# Patient Record
Sex: MALE | Race: WHITE | NOT HISPANIC OR LATINO | Employment: PART TIME | URBAN - NONMETROPOLITAN AREA
[De-identification: names, ages, dates, MRNs, and addresses within clinical notes are randomized per-mention and may not be internally consistent; named-entity substitution may affect disease eponyms.]

---

## 2020-02-18 ENCOUNTER — OFFICE VISIT (OUTPATIENT)
Dept: FAMILY MEDICINE CLINIC | Facility: CLINIC | Age: 40
End: 2020-02-18
Payer: COMMERCIAL

## 2020-02-18 VITALS
DIASTOLIC BLOOD PRESSURE: 74 MMHG | WEIGHT: 123 LBS | TEMPERATURE: 97.4 F | RESPIRATION RATE: 16 BRPM | HEIGHT: 71 IN | HEART RATE: 87 BPM | SYSTOLIC BLOOD PRESSURE: 122 MMHG | BODY MASS INDEX: 17.22 KG/M2 | OXYGEN SATURATION: 98 %

## 2020-02-18 DIAGNOSIS — R23.1 LIVEDO RETICULARIS: Primary | ICD-10-CM

## 2020-02-18 DIAGNOSIS — M54.50 BACK PAIN AT L4-L5 LEVEL: ICD-10-CM

## 2020-02-18 PROCEDURE — T1015 CLINIC SERVICE: HCPCS | Performed by: FAMILY MEDICINE

## 2020-02-18 RX ORDER — CYCLOBENZAPRINE HCL 10 MG
10 TABLET ORAL 3 TIMES DAILY PRN
Qty: 30 TABLET | Refills: 0 | Status: SHIPPED | OUTPATIENT
Start: 2020-02-18 | End: 2021-07-16

## 2020-02-18 RX ORDER — MELOXICAM 15 MG/1
15 TABLET ORAL DAILY
Qty: 30 TABLET | Refills: 0 | Status: SHIPPED | OUTPATIENT
Start: 2020-02-18 | End: 2021-07-16

## 2020-02-18 NOTE — PROGRESS NOTES
Assessment/Plan/Follow up information     Here to establish Care     Diagnosis ICD-10-CM Associated Orders   1  Livedo reticularis R23 1 Comprehensive metabolic panel     CBC and differential     TSH, 3rd generation     Anti-neutrophilic cytoplasmic antibody     RF Screen w/ Reflex to Titer     Cyclic citrul peptide antibody, IgG     Lipid panel     MRI lumbar spine w wo contrast     Antiphospholipid Syndrome Diagnostic Panel     Sedimentation rate, automated     Hepatitis panel, acute     TATUM Screen w/ Reflex to Titer/Pattern   2  Back pain at L4-L5 level M54 5 MRI lumbar spine w wo contrast     cyclobenzaprine (FLEXERIL) 10 mg tablet     meloxicam (MOBIC) 15 mg tablet       Livido Reticularis in setting of unintentional weight loss and  Back pain, concerns for malignancy  Pt currently uninsured will provide him phone number for [facility] financial counseling will start workup for Autoimmune and malignancy     All recent lab work and imagining reviewed on today's visit with patient, appropriate follow up was initiated if needed  Patient was counseled/education regarding their diagnosis, and the associated plan  They agreed with plan, all questions and concerns were answered/addressed  Advised to contact me or the office with any concerns or questions  In the event of an emergency, and unable to contact a provider they are to go to the emergency room  History:    Previous Provider:  Nubia Stokes    Medical Issues: 3 discs deterioating per patient  Surgical Issues: left pinky syrgery  Current Medications: Marijuana for back pain  Allergies: no  Pertinent Family History:  Immunizations: np    Smoker: 1pack for 30 years   Alcohol: socially  Illicit Drug Use: Marijuana      Subjective    HPI:  42-year-old male presents for 3 years of back pain,  states the back pain occurred after episode of lifting a large furnace  States he has seen doctors in the past was told he x-rays which were negative    He has been self treating with marijuana, denies ever seeing surgeon  Denies ever going to physical therapy  Denies any red flag symptoms urinary incontinence , saddle paresthesia, numbness, tingling in the lower extremities , gait abnormality  He does endorse a unintentional 20 lb weight loss  Review of Systems   Constitutional: Positive for unexpected weight change  Negative for activity change, appetite change, chills, diaphoresis, fatigue and fever  HENT: Negative for congestion, dental problem, drooling, ear discharge, ear pain, facial swelling and hearing loss  Eyes: Negative for photophobia, pain, discharge, redness and itching  Respiratory: Negative for apnea, cough, choking, chest tightness, shortness of breath and stridor  Gastrointestinal: Negative for abdominal distention, abdominal pain, anal bleeding, blood in stool, constipation and rectal pain  Musculoskeletal: Positive for back pain  Negative for arthralgias, gait problem, myalgias and neck pain  Neurological: Negative for dizziness, seizures, syncope, facial asymmetry, speech difficulty, light-headedness, numbness and headaches  Psychiatric/Behavioral: Negative for agitation, behavioral problems, confusion, decreased concentration, dysphoric mood and hallucinations  The patient is not nervous/anxious and is not hyperactive  Objective    Vitals:    02/18/20 1554   BP: 122/74   Pulse: 87   Resp: 16   Temp: (!) 97 4 °F (36 3 °C)   SpO2: 98%         Physical Exam   Constitutional: He is oriented to person, place, and time  He appears well-developed and well-nourished  HENT:   Head: Normocephalic  Eyes: Pupils are equal, round, and reactive to light  EOM are normal    Neck: Normal range of motion  Neck supple  Cardiovascular: Normal rate and regular rhythm  Pulmonary/Chest: Effort normal and breath sounds normal    Abdominal: Soft  Bowel sounds are normal    Musculoskeletal: Normal range of motion          Back:    Livido reticularis in the note region  Diffuse tenderness region of L2-S4, with paraspinal muscle tenderness, spinal process tenderness  Negative straight leg raises   Neurological: He is alert and oriented to person, place, and time  Skin: Skin is warm  Portions of the record may have been created with voice recognition software  Occasional wrong word or "sound a like" substitutions may have occurred due to the inherent limitations of voice recognition software  Read the chart carefully and recognize, using context, where substitutions have occurred  Contact me with any questions         Ricky Golden MD 02/18/20

## 2021-06-30 ENCOUNTER — TELEPHONE (OUTPATIENT)
Dept: PAIN MEDICINE | Facility: CLINIC | Age: 41
End: 2021-06-30

## 2021-06-30 NOTE — TELEPHONE ENCOUNTER
Tried calling patient to schedule but vm not set up, dr Ekta Manning ok'd to see once his mri is completed and results are in

## 2021-07-16 ENCOUNTER — CONSULT (OUTPATIENT)
Dept: PAIN MEDICINE | Facility: CLINIC | Age: 41
End: 2021-07-16
Payer: COMMERCIAL

## 2021-07-16 VITALS
SYSTOLIC BLOOD PRESSURE: 90 MMHG | DIASTOLIC BLOOD PRESSURE: 68 MMHG | HEART RATE: 88 BPM | RESPIRATION RATE: 20 BRPM | BODY MASS INDEX: 17.44 KG/M2 | TEMPERATURE: 97.8 F | HEIGHT: 71 IN | WEIGHT: 124.6 LBS

## 2021-07-16 DIAGNOSIS — M54.16 LUMBAR RADICULOPATHY: Primary | ICD-10-CM

## 2021-07-16 PROCEDURE — 99204 OFFICE O/P NEW MOD 45 MIN: CPT | Performed by: ANESTHESIOLOGY

## 2021-07-16 RX ORDER — GABAPENTIN 300 MG/1
300 CAPSULE ORAL 3 TIMES DAILY
Qty: 90 CAPSULE | Refills: 0 | Status: SHIPPED | OUTPATIENT
Start: 2021-07-16 | End: 2021-08-13

## 2021-07-16 NOTE — PROGRESS NOTES
Assessment  1  Lumbar radiculopathy - Left  -     Ambulatory referral to Physical Therapy; Future  -     gabapentin (NEURONTIN) 300 mg capsule; Take 1 capsule (300 mg total) by mouth 3 (three) times a day    Acute left sided lumbar radicular pain in L5 and S1 Dermatomal distribution accompanied by weakness numbness and paresthesias  5/5 strength bilaterally, +facet loading maneuvers, left greater than right  No imaging available currently to review  Patient has trialed NSAIDs without significant benefit  Reasonable to proceed with multimodal pain therapy plan while obtaining  more advanced imaging to further guide interventional therapy  Disc degneration seen at L5-S1 that is contributory  Plan  -f/u 4 weeks or sooner  -gabapentin 300 mg t i d  Ordered for patient; counseled regarding sedative effects of taking this medication and provided up titration calendar  Counseled not to take medication while driving or operating heavy machinery/using stairs  -physical therapy for right-sided lumbar radiculopathy; Core exercises additionally provided for physician directed home PT that the patient plans to participate with for 1 hour, twice a week for the next 6 weeks  There are risks associated with opioid medications, including dependence, addiction and tolerance  The patient understands and agrees to use these medications only as prescribed  Potential side effects of the medications include, but are not limited to, constipation, drowsiness, addiction, impaired judgment and risk of fatal overdose if not taken as prescribed  The patient was warned against driving while taking sedation medications  Sharing medications is a felony  At this point in time, the patient is showing no signs of addiction, abuse, diversion or suicidal ideation      [place] Prescription Drug Monitoring Program report was reviewed and was appropriate     Complete risks and benefits including bleeding, infection, tissue reaction, nerve injury and allergic reaction were discussed  The approach was demonstrated using models and literature was provided  Verbal and written consent was obtained  My impressions and treatment recommendations were discussed in detail with the patient who verbalized understanding and had no further questions  Discharge instructions were provided  I personally saw and examined the patient and I agree with the above discussed plan of care  New Medications Ordered This Visit   Medications    gabapentin (NEURONTIN) 300 mg capsule     Sig: Take 1 capsule (300 mg total) by mouth 3 (three) times a day     Dispense:  90 capsule     Refill:  0       History of Present Illness    Juno Salmon is a 39 y o  male with an acute on chronic history of chronic lumbar radicular pain in the left L5 dermatomal distribution  Debilitating weakness numbness and paresthesias accompany the pain  The patient rates the pain at a 8/10 accompanied by electric shock-like shooting features and crampy burning pain in the aforementioned dermatomal distributions  The pain is worse in the mornings as well as the end of the day; exertion such as walking for long periods of time seems to exacerbate the pain  The patient can hardly walk more than a few blocks without having debilitating pain  He tries to maintain an active lifestyle and finds that the current degree of pain seems to compromises his  efforts  The pain significantly impacts independent activities of daily living and contributes to significant disability  He has not yet attempted physical therapy  He has taken meloxicam as well as Flexeril with limited relief of the pain as well  He has never tried epidural steroid injections in the past or gabapentin   He denies any bowel or bladder dysfunction/incontinence      I have personally reviewed and/or updated the patient's past medical history, past surgical history, family history, social history, current medications, allergies, and vital signs today  Review of Systems   Constitutional: Positive for activity change  HENT: Negative  Eyes: Negative  Respiratory: Negative  Cardiovascular: Negative  Gastrointestinal: Negative  Endocrine: Negative  Genitourinary: Negative  Musculoskeletal: Positive for arthralgias, back pain, gait problem and myalgias  Skin: Negative  Allergic/Immunologic: Negative  Neurological: Positive for weakness and numbness  Hematological: Negative  Psychiatric/Behavioral: Negative  All other systems reviewed and are negative  Patient Active Problem List   Diagnosis    Lumbar radiculopathy - Left       History reviewed  No pertinent past medical history  Past Surgical History:   Procedure Laterality Date    HAND SURGERY Left     SKIN GRAFT         Family History   Problem Relation Age of Onset    No Known Problems Mother     No Known Problems Father        Social History     Occupational History    Not on file   Tobacco Use    Smoking status: Current Every Day Smoker     Types: Cigarettes    Smokeless tobacco: Never Used   Vaping Use    Vaping Use: Never used   Substance and Sexual Activity    Alcohol use: Not Currently    Drug use: Yes     Types: Marijuana    Sexual activity: Yes     Partners: Female       Current Outpatient Medications on File Prior to Visit   Medication Sig    [DISCONTINUED] cyclobenzaprine (FLEXERIL) 10 mg tablet Take 1 tablet (10 mg total) by mouth 3 (three) times a day as needed for muscle spasms    [DISCONTINUED] meloxicam (MOBIC) 15 mg tablet Take 1 tablet (15 mg total) by mouth daily     No current facility-administered medications on file prior to visit         No Known Allergies      Physical Exam    BP 90/68   Pulse 88   Temp 97 8 °F (36 6 °C)   Resp 20   Ht 5' 11" (1 803 m)   Wt 56 5 kg (124 lb 9 6 oz)   BMI 17 38 kg/m²     Constitutional: normal, well developed, well nourished, alert, in no distress and non-toxic and no overt pain behavior  and underweight  Eyes: anicteric  HEENT: grossly intact  Neck: supple, symmetric, trachea midline and no masses   Pulmonary:even and unlabored  Cardiovascular:No edema or pitting edema present  Skin:Normal without rashes or lesions and well hydrated  Psychiatric:Mood and affect appropriate  Neurologic:Cranial Nerves II-XII grossly intact Sensation grossly intact; no clonus negative mendiola's  Reflexes 2+ and brisk  SLR  Positive left-sided  Musculoskeletal:normal gait  Normal heel toe and tip toe walking  pain with lumbar facet loading bilaterally, left greater than right and and with lateral spine rotation, left greater than right  ttp over lumbar paraspinal muscles  Negative iqra's test, negative gaenslen's negative SIJ loading bilaterally  Imaging    EXAM   XR L SPINE AP AND LATERAL-6/29/2021 4:17 pm     HISTORY   chronic pain     COMPARISON   Lumbar spine radiographs dating 12/11/2018  TECHNIQUE   Three views of the lumbosacral spine  FINDINGS   There are 5 non-rib-bearing lumbar type vertebral bodies  L5-S1 disc space narrowing   Vertebral body heights and remaining disc spaces are preserved

## 2021-07-30 ENCOUNTER — EVALUATION (OUTPATIENT)
Dept: PHYSICAL THERAPY | Facility: CLINIC | Age: 41
End: 2021-07-30
Payer: COMMERCIAL

## 2021-07-30 DIAGNOSIS — M54.16 LUMBAR RADICULOPATHY: Primary | ICD-10-CM

## 2021-07-30 PROCEDURE — 97112 NEUROMUSCULAR REEDUCATION: CPT | Performed by: PHYSICAL THERAPIST

## 2021-07-30 PROCEDURE — 97162 PT EVAL MOD COMPLEX 30 MIN: CPT | Performed by: PHYSICAL THERAPIST

## 2021-07-30 NOTE — PROGRESS NOTES
PT Evaluation     Today's date: 2021  Patient name: Harrison Pena  : 1980  MRN: [id number]  Referring provider: Elizabeth Márquez MD  Dx:   Encounter Diagnosis     ICD-10-CM    1  Lumbar radiculopathy - Left  M54 16 Ambulatory referral to Physical Therapy                  Assessment  Assessment details: Patient is a 39year old male who presents to PT with c/o pain in low back  PT examination shows limitations including weakness, decreased core/lumbar stability, limited flexibility and rom, causing increased pain and radicular symptoms  Patient would benefit from PT intervention including exercises for rom, strength and stability, manual therapy, HEP, functional training, aerobic conditioning and pain relieving modalities in order to maximize functional independence  Impairments: abnormal or restricted ROM, activity intolerance, impaired physical strength, lacks appropriate home exercise program and pain with function    Goals  ST  Initiate HEP  2  Patient to report decreased pain at worst by 50% in 4 weeks    LT  Patient to report decreased pain at worst to 2-3/10 in 8 weeks  2  Patient to increase core/lumbar stability to [facility] in 8 weeks  3  Patient to improve flexion rom to [facility] in 8 weeks  4   Patient to improve functional and work ability to [facility] in 8 weeks    Plan  Patient would benefit from: PT eval and skilled physical therapy  Planned modality interventions: cryotherapy, thermotherapy: hydrocollator packs, ultrasound and unattended electrical stimulation  Other planned modality interventions: Modalities PRN  Planned therapy interventions: abdominal trunk stabilization, IADL retraining, ADL retraining, joint mobilization, manual therapy, balance, neuromuscular re-education, patient education, strengthening, stretching, therapeutic activities, therapeutic exercise, therapeutic training, functional ROM exercises, flexibility, graded activity, graded exercise and home exercise program  Frequency: 2x week  Duration in visits: 8  Duration in weeks: 4  Treatment plan discussed with: patient        Subjective Evaluation    History of Present Illness  Date of onset: 2018  Mechanism of injury: Patient presents to PT with c/o left low back pain with radiculopathy  Patient reports he has had pain since injuring his back lifting a furnace 4 years ago  It has been worsening so he decided to see spine specialist  Patient was given gabapentin which he takes at night  Patient reports the pain is at the bottom of his spine and if he turns the wrong was he will get a shooting pain into his left LE  He reports increased pain with bending and lifting  Patient reports he was diagnosed with DDD  Patient returns to doctor in 2-3 weeks and is now referred to oppt  Pain  Current pain ratin  At best pain ratin  At worst pain rating: 10  Quality: discomfort (Pinching, shooting)  Relieving factors: heat, rest, support and medications  Aggravating factors: walking, standing and sitting  Progression: worsening    Patient Goals  Patient goals for therapy: decreased pain  Patient goal: To get back to doing things         Objective     Concurrent Complaints  Negative for night pain, disturbed sleep, bladder dysfunction, bowel dysfunction, saddle (S4) numbness, cardiac problem, kidney problem, gallbladder problem, stomach problem, ulcer, appendix problem, spleen problem, pancreas problem, history of cancer, history of trauma and infection    Palpation   Left   Muscle spasm in the lumbar paraspinals  Tenderness of the lumbar paraspinals  Right   Muscle spasm in the lumbar paraspinals  Tenderness of the lumbar paraspinals  Tenderness     Lumbar Spine  Tenderness in the spinous process and facet joint       Additional Tenderness Details  TTP noted at each lumbar levels    Neurological Testing     Sensation     Lumbar   Left   Intact: light touch    Right   Intact: light touch    Active Range of Motion     Lumbar   Flexion:  Restriction level: minimal  Extension:  WFL  Left lateral flexion:  WFL  Right lateral flexion:  WFL  Left rotation:  WFL  Right rotation:  [facility]    Joint Play     Pain: L2, L3, L4, L5 and S1   Mechanical Assessment    Cervical      Thoracic      Lumbar    Standing flexion:   Pain intensity: worse  Standing extension:   Pain location: no change    Strength/Myotome Testing     Lumbar   Left   Normal strength    Right   Normal strength    Additional Strength Details  3+/5    Muscle Activation   Patient unable to activate left transverse abdominals and right transverse abdominals  Tests     Lumbar     Left   Positive passive SLR  Negative crossed SLR  Right   Negative crossed SLR and passive SLR  Left Hip   Negative HEVER, FADIR and long sit  Right Hip   Negative HEVER, FADIR and long sit       General Comments:    Lower quarter screen   Hips: unremarkable  Knees: unremarkable  Foot/ankle: unremarkable             Precautions: None              Manuals 7/30/21       LE Stretch                                Neuro Re-Ed         MTP/LTP        PPT 2x10       PPT with march 10x B/L       PPT with heel slides 10x B/L       Bridges        Supine Hip adduction squeeze                                        Ther Ex        [facility] [facility]        TR/HR        Standing Trunk Ext        Supine hip abd with TB                                        Ther Activity                        Gait Training                        Modalities

## 2021-08-02 ENCOUNTER — OFFICE VISIT (OUTPATIENT)
Dept: PHYSICAL THERAPY | Facility: CLINIC | Age: 41
End: 2021-08-02
Payer: COMMERCIAL

## 2021-08-02 DIAGNOSIS — M54.16 LUMBAR RADICULOPATHY: Primary | ICD-10-CM

## 2021-08-02 PROCEDURE — 97110 THERAPEUTIC EXERCISES: CPT

## 2021-08-02 PROCEDURE — 97112 NEUROMUSCULAR REEDUCATION: CPT

## 2021-08-02 NOTE — PROGRESS NOTES
Daily Note     Today's date: 2021  Patient name: Belia Benitez  : 1980  MRN: [id number]  Referring provider: Stephani Kunz MD  Dx:   Encounter Diagnosis     ICD-10-CM    1  Lumbar radiculopathy - Left  M54 16                   Subjective: Patient reports "My back is feeling better "      Objective: See treatment diary below      Assessment: Tolerated treatment well  Patient exhibited good technique with therapeutic exercises      Plan: Continue per plan of care        Precautions: None    Manuals 21      LE Stretch                                Neuro Re-Ed         MTP/LTP        PPT 2x10 2x10      PPT with march 10x B/L 10x b/l      PPT with heel slides 10x B/L 10x b/l      Bridges  With hip squeeze  2x10      Supine Hip adduction squeeze                                        Ther Ex        SRC  l3 10 min      TR/HR  2x10      Standing Trunk Ext  10x10"      Supine hip abd with TB                                        Ther Activity                        Gait Training                        Modalities

## 2021-08-04 ENCOUNTER — OFFICE VISIT (OUTPATIENT)
Dept: PHYSICAL THERAPY | Facility: CLINIC | Age: 41
End: 2021-08-04
Payer: COMMERCIAL

## 2021-08-04 DIAGNOSIS — M54.16 LUMBAR RADICULOPATHY: Primary | ICD-10-CM

## 2021-08-04 PROCEDURE — 97110 THERAPEUTIC EXERCISES: CPT

## 2021-08-04 PROCEDURE — 97112 NEUROMUSCULAR REEDUCATION: CPT

## 2021-08-04 NOTE — PROGRESS NOTES
Daily Note     Today's date: 2021  Patient name: Bushra Hernandez  : 1980  MRN: [id number]  Referring provider: Moira Vincent MD  Dx:   Encounter Diagnosis     ICD-10-CM    1  Lumbar radiculopathy - Left  M54 16                   Subjective: Patient reports low back is sore today secondary to busting up a concrete wall  Objective: See treatment diary below      Assessment: Tolerated treatment well  Patient exhibited good technique with therapeutic exercises      Plan: Continue per plan of care        Precautions: None    Manuals 21     LE Stretch                                Neuro Re-Ed         MTP/LTP   blk 2x10 each     PPT 2x10 2x10 2x10     PPT with march 10x B/L 10x b/l 10x b/l     PPT with heel slides 10x B/L 10x b/l 10x b/l     Bridges  With hip squeeze  2x10 2x10 with hip squeeze     Supine Hip adduction squeeze                                        Ther Ex        SRC  l3 10 min l3 10 min      TR/HR  2x10 2x10     Standing Trunk Ext  10x10" 10x10"     Supine hip abd with TB   gTB 2x10                                     Ther Activity                        Gait Training                        Modalities

## 2021-08-09 ENCOUNTER — OFFICE VISIT (OUTPATIENT)
Dept: PHYSICAL THERAPY | Facility: CLINIC | Age: 41
End: 2021-08-09
Payer: COMMERCIAL

## 2021-08-09 DIAGNOSIS — M54.16 LUMBAR RADICULOPATHY: Primary | ICD-10-CM

## 2021-08-09 PROCEDURE — 97110 THERAPEUTIC EXERCISES: CPT

## 2021-08-09 PROCEDURE — 97112 NEUROMUSCULAR REEDUCATION: CPT

## 2021-08-09 NOTE — PROGRESS NOTES
Daily Note     Today's date: 2021  Patient name: Dov Gasca  : 1980  MRN: [id number]  Referring provider: Nima Valladares MD  Dx:   Encounter Diagnosis     ICD-10-CM    1  Lumbar radiculopathy - Left  M54 16                   Subjective: Patient reports low back is feeling better today  Objective: See treatment diary below      Assessment: Tolerated treatment well  Patient exhibited good technique with therapeutic exercises      Plan: Continue per plan of care        Precautions: None    Manuals 21    LE Stretch                                Neuro Re-Ed         MTP/LTP   blk 2x10 each blk 2x10 each    PPT 2x10 2x10 2x10 2x10     PPT with march 10x B/L 10x b/l 10x b/l 10x b/l    PPT with heel slides 10x B/L 10x b/l 10x b/l 10x b/l    Bridges  With hip squeeze  2x10 2x10 with hip squeeze 2x10 with hip squeeze    Supine Hip adduction squeeze                                        Ther Ex        SRC  l3 10 min l3 10 min  l5 10 min    TR/HR  2x10 2x10 2x10    Standing Trunk Ext  10x10" 10x10" 10x10"    Supine hip abd with TB   gTB 2x10 GTB 2x10    Leg press     75# 2x10                            Ther Activity                        Gait Training                        Modalities

## 2021-08-11 ENCOUNTER — OFFICE VISIT (OUTPATIENT)
Dept: PHYSICAL THERAPY | Facility: CLINIC | Age: 41
End: 2021-08-11
Payer: COMMERCIAL

## 2021-08-11 DIAGNOSIS — M54.16 LUMBAR RADICULOPATHY: Primary | ICD-10-CM

## 2021-08-11 PROCEDURE — 97110 THERAPEUTIC EXERCISES: CPT

## 2021-08-11 PROCEDURE — 97112 NEUROMUSCULAR REEDUCATION: CPT

## 2021-08-11 NOTE — PROGRESS NOTES
Daily Note     Today's date: 2021  Patient name: Yannick Younger  : 1980  MRN: [id number]  Referring provider: Amanda Ramsey MD  Dx:   Encounter Diagnosis     ICD-10-CM    1  Lumbar radiculopathy - Left  M54 16                   Subjective: Patient reports increased pain in low back secondary to the leg press  Objective: See treatment diary below      Assessment: Tolerated treatment well  Patient exhibited good technique with therapeutic exercises      Plan: Continue per plan of care        Precautions: None    Manuals 21   LE Stretch                                Neuro Re-Ed         MTP/LTP   blk 2x10 each blk 2x10 each blk 2x10 each   PPT 2x10 2x10 2x10 2x10  2x10   PPT with march 10x B/L 10x b/l 10x b/l 10x b/l 10x b/l   PPT with heel slides 10x B/L 10x b/l 10x b/l 10x b/l 10x b/l   Bridges  With hip squeeze  2x10 2x10 with hip squeeze 2x10 with hip squeeze 2x10 with hip squeeze   Supine Hip adduction squeeze                                        Ther Ex        SRC  l3 10 min l3 10 min  l5 10 min l5 10 min    TR/HR  2x10 2x10 2x10 2x10   Standing Trunk Ext  10x10" 10x10" 10x10" 10x10"   Supine hip abd with TB   gTB 2x10 GTB 2x10 GTB 2x10   Leg press     75# 2x10 held                           Ther Activity                        Gait Training                        Modalities

## 2021-08-13 ENCOUNTER — OFFICE VISIT (OUTPATIENT)
Dept: PAIN MEDICINE | Facility: CLINIC | Age: 41
End: 2021-08-13
Payer: COMMERCIAL

## 2021-08-13 VITALS
RESPIRATION RATE: 20 BRPM | SYSTOLIC BLOOD PRESSURE: 98 MMHG | HEART RATE: 83 BPM | TEMPERATURE: 98.7 F | HEIGHT: 71 IN | BODY MASS INDEX: 17.58 KG/M2 | DIASTOLIC BLOOD PRESSURE: 64 MMHG | WEIGHT: 125.6 LBS

## 2021-08-13 DIAGNOSIS — M54.16 LUMBAR RADICULOPATHY: Primary | ICD-10-CM

## 2021-08-13 PROCEDURE — 3008F BODY MASS INDEX DOCD: CPT | Performed by: ANESTHESIOLOGY

## 2021-08-13 PROCEDURE — 99214 OFFICE O/P EST MOD 30 MIN: CPT | Performed by: ANESTHESIOLOGY

## 2021-08-13 RX ORDER — GABAPENTIN 100 MG/1
100 CAPSULE ORAL 3 TIMES DAILY
Qty: 90 CAPSULE | Refills: 0 | Status: SHIPPED | OUTPATIENT
Start: 2021-08-13 | End: 2021-09-22

## 2021-08-13 NOTE — PROGRESS NOTES
Assessment  1  Lumbar radiculopathy  -     MRI lumbar spine wo contrast; Future; Expected date: 08/13/2021  -     gabapentin (NEURONTIN) 100 mg capsule; Take 1 capsule (100 mg total) by mouth 3 (three) times a day    Acute left sided lumbar radicular pain in L5 and S1 Dermatomal distribution accompanied by weakness numbness and paresthesias  5/5 strength bilaterally, +facet loading maneuvers, left greater than right  No imaging available currently to review  Patient has trialed NSAIDs without significant benefit  Has started physical therapy and is having good benefit with that and with taking gabapentin  Describes mild drowsiness with it so is only taking it at night; will lower dose Reasonable to proceed with multimodal pain therapy plan while obtaining  more advanced imaging to further guide interventional therapy  Disc degneration seen at L5-S1 that is contributory on xray  Plan  -f/u after MRI lumbar spine  -gabapentin reordered at 100 mg t i d  Ordered for patient; counseled regarding sedative effects of taking this medication and provided up titration calendar  Counseled not to take medication while driving or operating heavy machinery/using stairs  -continue physical therapy for right-sided lumbar radiculopathy; Core exercises additionally provided for physician directed home PT that the patient plans to participate with for 1 hour, twice a week for the next 6 weeks  There are risks associated with opioid medications, including dependence, addiction and tolerance  The patient understands and agrees to use these medications only as prescribed  Potential side effects of the medications include, but are not limited to, constipation, drowsiness, addiction, impaired judgment and risk of fatal overdose if not taken as prescribed  The patient was warned against driving while taking sedation medications  Sharing medications is a felony   At this point in time, the patient is showing no signs of addiction, abuse, diversion or suicidal ideation  [place] Prescription Drug Monitoring Program report was reviewed and was appropriate     Complete risks and benefits including bleeding, infection, tissue reaction, nerve injury and allergic reaction were discussed  The approach was demonstrated using models and literature was provided  Verbal and written consent was obtained  My impressions and treatment recommendations were discussed in detail with the patient who verbalized understanding and had no further questions  Discharge instructions were provided  I personally saw and examined the patient and I agree with the above discussed plan of care  New Medications Ordered This Visit   Medications    gabapentin (NEURONTIN) 100 mg capsule     Sig: Take 1 capsule (100 mg total) by mouth 3 (three) times a day     Dispense:  90 capsule     Refill:  0       History of Present Illness    Kyle Kendrick is a 39 y o  male with an acute on chronic history of chronic lumbar radicular pain in the left L5 dermatomal distribution  Debilitating weakness numbness and paresthesias accompany the pain  The patient rates the pain at a 8/10 accompanied by electric shock-like shooting features and crampy burning pain in the aforementioned dermatomal distributions  The pain is worse in the mornings as well as the end of the day; exertion such as walking for long periods of time seems to exacerbate the pain  The patient can hardly walk more than a few blocks without having debilitating pain  He tries to maintain an active lifestyle and finds that the current degree of pain seems to compromises his  efforts  The pain significantly impacts independent activities of daily living and contributes to significant disability  He has been having improvements with IADLs and less pain with his physical therapy sessions  He has taken gabapentin with good relief of the pain as well    He has never tried epidural steroid injections in the past or gabapentin  He denies any bowel or bladder dysfunction/incontinence      I have personally reviewed and/or updated the patient's past medical history, past surgical history, family history, social history, current medications, allergies, and vital signs today  Review of Systems   Constitutional: Positive for activity change  HENT: Negative  Eyes: Negative  Respiratory: Negative  Cardiovascular: Negative  Gastrointestinal: Negative  Endocrine: Negative  Genitourinary: Negative  Musculoskeletal: Positive for arthralgias, back pain, gait problem and myalgias  Skin: Negative  Allergic/Immunologic: Negative  Neurological: Positive for weakness and numbness  Hematological: Negative  Psychiatric/Behavioral: Negative  All other systems reviewed and are negative  Patient Active Problem List   Diagnosis    Lumbar radiculopathy - Left       History reviewed  No pertinent past medical history  Past Surgical History:   Procedure Laterality Date    HAND SURGERY Left     SKIN GRAFT         Family History   Problem Relation Age of Onset    No Known Problems Mother     No Known Problems Father        Social History     Occupational History    Not on file   Tobacco Use    Smoking status: Current Every Day Smoker     Types: Cigarettes    Smokeless tobacco: Never Used   Vaping Use    Vaping Use: Never used   Substance and Sexual Activity    Alcohol use: Not Currently    Drug use: Yes     Types: Marijuana    Sexual activity: Yes     Partners: Female       Current Outpatient Medications on File Prior to Visit   Medication Sig    [DISCONTINUED] gabapentin (NEURONTIN) 300 mg capsule Take 1 capsule (300 mg total) by mouth 3 (three) times a day     No current facility-administered medications on file prior to visit         No Known Allergies      Physical Exam    BP 98/64   Pulse 83   Temp 98 7 °F (37 1 °C)   Resp 20   Ht 5' 11" (1 803 m)   Wt 57 kg (125 lb 9 6 oz)   BMI 17 52 kg/m²     Constitutional: normal, well developed, well nourished, alert, in no distress and non-toxic and no overt pain behavior  and underweight  Eyes: anicteric  HEENT: grossly intact  Neck: supple, symmetric, trachea midline and no masses   Pulmonary:even and unlabored  Cardiovascular:No edema or pitting edema present  Skin:Normal without rashes or lesions and well hydrated  Psychiatric:Mood and affect appropriate  Neurologic:Cranial Nerves II-XII grossly intact Sensation grossly intact; no clonus negative mendiola's  Reflexes 2+ and brisk  SLR  Positive left-sided  Musculoskeletal:normal gait  Normal heel toe and tip toe walking  pain with lumbar facet loading bilaterally, left greater than right and and with lateral spine rotation, left greater than right  ttp over lumbar paraspinal muscles  Negative iqra's test, negative gaenslen's negative SIJ loading bilaterally  Imaging    EXAM   XR L SPINE AP AND LATERAL-6/29/2021 4:17 pm     HISTORY   chronic pain     COMPARISON   Lumbar spine radiographs dating 12/11/2018  TECHNIQUE   Three views of the lumbosacral spine  FINDINGS   There are 5 non-rib-bearing lumbar type vertebral bodies  L5-S1 disc space narrowing   Vertebral body heights and remaining disc spaces are preserved

## 2021-08-13 NOTE — PATIENT INSTRUCTIONS
Core Strengthening Exercises   WHAT YOU NEED TO KNOW:   Your core includes the muscles of your lower back, hip, pelvis, and abdomen  Core strengthening exercises help heal and strengthen these muscles  This helps prevent another injury, and keeps your pelvis, spine, and hips in the correct position  DISCHARGE INSTRUCTIONS:   Contact your healthcare provider if:   · You have sharp or worsening pain during exercise or at rest     · You have questions or concerns about your shoulder exercises  Safety tips:  Talk to your healthcare provider before you start an exercise program  A physical therapist can teach you how to do core strengthening exercises safely  · Do the exercises on a mat or firm surface  A firm surface will support your spine and prevent low back pain  Do not do these exercises on a bed  · Move slowly and smoothly  Avoid fast or jerky motions  · Stop if you feel pain  Core exercises should not be painful  Stop if you feel pain  · Breathe normally during core exercises  Do not hold your breath  This may cause an increase in blood pressure and prevent muscle strengthening  Your healthcare provider will tell you when to inhale and exhale during the exercise  · Begin all of your exercises with abdominal bracing  Abdominal bracing helps warm up your core muscles  You can also practice abdominal bracing throughout the day  Lie on your back with your knees bent and feet flat on the floor  Place your arms in a relaxed position beside your body  Tighten your abdominal muscles  Pull your belly button in and up toward your spine  Hold for 5 seconds  Relax your muscles  Repeat 10 times  Core strengthening exercises: Your healthcare provider will tell you how often to do these exercises  The provider will also tell you how many repetitions of each exercise you should do  Hold each exercise for 5 seconds or as directed  As you get stronger, increase your hold to 10 to 15 seconds   You can do some of these exercises on a stability ball, or with a weight  Ask your healthcare provider how to use a stability ball or weight for these exercises:  · Bridging:  Lie on your back with your knees bent and feet flat on the floor  Rest your arms at your side  Tighten your buttocks, and then lift your hips 1 inch off the floor  Hold for 5 seconds  When you can do this exercise without pain for 10 seconds, increase the distance you lift your hips  A good goal is to be able to lift your hips so that your shoulders, hips, and knees are in a straight line  · Dead bug:  Lie on your back with your knees bent and feet flat on the floor  Place your arms in a relaxed position beside your body  Begin with abdominal bracing  Next, raise one leg, keeping your knee bent  Hold for 5 seconds  Repeat with the other leg  When you can do this exercise without pain for 10 to 15 seconds, you may raise one straight leg and hold  Repeat with the other leg  · Quadruped:  Place your hands and knees on the floor  Keep your wrists directly below your shoulders and your knees directly below your hips  Pull your belly button in toward your spine  Do not flatten or arch your back  Tighten your abdominal muscles below your belly button  Hold for 5 seconds  When you can do this exercise without pain for 10 to 15 seconds, you may extend one arm and hold  Repeat on the other side  · Side bridge exercises:      ? Standing side bridge:  Stand next to a wall and extend one arm toward the wall  Place your palm flat on the wall with your fingers pointing upward  Begin with abdominal bracing  Next, without moving your feet, slowly bend your arm to 90 degrees  Hold for 5 seconds  Repeat on the other side  When you can do this exercise without pain for 10 to 15 seconds, you may do the bent leg side bridge on the floor  ? Bent leg side bridge:  Lie on one side with your legs, hips, and shoulders in a straight line   Prop yourself up onto your forearm so your elbow is directly below your shoulder  Bend your knees back to 90 degrees  Begin with abdominal bracing  Next, lift your hips and balance yourself on your forearm and knees  Hold for 5 seconds  Repeat on the other side  When you can do this exercise without pain for 10 to 15 seconds, you may do the straight leg side bridge on the floor  ? Straight leg side bridge:  Lie on one side with your legs, hips, and shoulders in a straight line  Prop yourself up onto your forearm so your elbow is directly below your shoulder  Begin with abdominal bracing  Lift your hips off the floor and balance yourself on your forearm and the outside of your flexed foot  Do not let your ankle bend sideways  Hold for 5 seconds  Repeat on the other side  When you can do this exercise without pain for 10 to 15 seconds, ask your healthcare provider for more advanced exercises  · Superman:  Lie on your stomach  Extend your arms forward on the floor  Tighten your abdominal muscles and lift your right hand and left leg off the floor  Hold this position  Slowly return to the starting position  Tighten your abdominal muscles and lift your left hand and right leg off the floor  Hold this position  Slowly return to the starting position  · Clam:  Lie on your side with your knees bent  Put your bottom arm under your head to keep your neck in line  Put your top hand on your hip to keep your pelvis from moving  Put your heels together, and keep them together during this exercise  Slowly raise your top knee toward the ceiling  Then lower your leg so your knees are together  Repeat this exercise 10 times  Then switch sides and do the exercise 10 times with the other leg  · Curl up:  Lie on your back with your knees bent and feet flat on the floor  Place your hands, palms down, underneath your lower back   Next, with your elbows on the floor, lift your shoulders and chest 2 to 3 inches off the floor  Keep your head in line with your shoulders  Hold this position  Slowly return to the starting position  · Straight leg raises:  Lie on your back with one leg straight  Bend the other knee and place your foot flat on the floor  Tighten your abdominal muscles  Keep your leg straight and slowly lift it straight up 6 to 12 inches off the floor  Hold this position  Lower your leg slowly  Do as many repetitions as directed on this side  Repeat with the other leg  · Plank:  Lie on your stomach  Bend your elbows and place your forearms flat on the floor  Lift your chest, stomach, and knees off the floor  Make sure your elbows are below your shoulders  Your body should be in a straight line  Do not let your hips or lower back sink to the ground  Squeeze your abdominal muscles together and hold for 15 seconds  To make this exercise harder, hold for 30 seconds or lift 1 leg at a time  · Bicycles:  Lie on your back  Bend both knees and bring them toward your chest  Your calves should be parallel to the floor  Place the palms of your hands on the back of your head  Straighten your right leg and keep it lifted 2 inches off the floor  Raise your head and shoulders off the floor and twist towards your left  Keep your head and shoulders lifted  Bend your right knee while you straighten your left leg  Keep your left leg 2 inches off the floor  Twist your head and chest towards the left leg  Continue to straighten 1 leg at a time and twist        Follow up with your healthcare provider as directed:  Write down your questions so you remember to ask them during your visits  © Copyright [employer] 2021 Information is for End User's use only and may not be sold, redistributed or otherwise used for commercial purposes  All illustrations and images included in CareNotes® are the copyrighted property of A D [employer] Inc  or [facility] Norma Weber   The above information is an  only   It is not intended as medical advice for individual conditions or treatments  Talk to your doctor, nurse or pharmacist before following any medical regimen to see if it is safe and effective for you

## 2021-08-16 ENCOUNTER — OFFICE VISIT (OUTPATIENT)
Dept: PHYSICAL THERAPY | Facility: CLINIC | Age: 41
End: 2021-08-16
Payer: COMMERCIAL

## 2021-08-16 DIAGNOSIS — M54.16 LUMBAR RADICULOPATHY: Primary | ICD-10-CM

## 2021-08-16 PROCEDURE — 97112 NEUROMUSCULAR REEDUCATION: CPT

## 2021-08-16 PROCEDURE — 97110 THERAPEUTIC EXERCISES: CPT

## 2021-08-16 NOTE — PROGRESS NOTES
Daily Note     Today's date: 2021  Patient name: Michael Estrada  : 1980  MRN: [id number]  Referring provider: Estuardo Garcia MD  Dx:   Encounter Diagnosis     ICD-10-CM    1  Lumbar radiculopathy - Left  M54 16                   Subjective: Patient reports some soreness today but not as much as before  Objective: See treatment diary below      Assessment: Tolerated treatment well  Patient exhibited good technique with therapeutic exercises      Plan: Continue per plan of care        Precautions: None    Manuals 21   LE Stretch                                Neuro Re-Ed         MTP/LTP blk 2x10 each  blk 2x10 each blk 2x10 each blk 2x10 each   PPT 2x10 2x10 2x10 2x10  2x10   PPT with march 10x B/L 10x b/l 10x b/l 10x b/l 10x b/l   PPT with heel slides 10x B/L 10x b/l 10x b/l 10x b/l 10x b/l   Bridges 2x10 with hip squeeze With hip squeeze  2x10 2x10 with hip squeeze 2x10 with hip squeeze 2x10 with hip squeeze   Supine Hip adduction squeeze                                        Ther Ex        SRC l5 10 min l3 10 min l3 10 min  l5 10 min l5 10 min    TR/HR 2x10 2x10 2x10 2x10 2x10   Standing Trunk Ext 10x10" 10x10" 10x10" 10x10" 10x10"   Supine hip abd with TB GTB 2x10  gTB 2x10 GTB 2x10 GTB 2x10   Leg press     75# 2x10 held                           Ther Activity                        Gait Training                        Modalities

## 2021-08-18 ENCOUNTER — OFFICE VISIT (OUTPATIENT)
Dept: PHYSICAL THERAPY | Facility: CLINIC | Age: 41
End: 2021-08-18
Payer: COMMERCIAL

## 2021-08-18 DIAGNOSIS — M54.16 LUMBAR RADICULOPATHY: Primary | ICD-10-CM

## 2021-08-18 PROCEDURE — 97110 THERAPEUTIC EXERCISES: CPT

## 2021-08-18 PROCEDURE — 97112 NEUROMUSCULAR REEDUCATION: CPT

## 2021-08-18 NOTE — PROGRESS NOTES
Daily Note     Today's date: 2021  Patient name: Michael Estrada  : 1980  MRN: [id number]  Referring provider: Estuardo Garcia MD  Dx:   Encounter Diagnosis     ICD-10-CM    1  Lumbar radiculopathy - Left  M54 16                   Subjective: Patient reports since Saturday low back has been extremely painful  Patient reports turning and having immediate pain in low back and tingling in left le down to patella  Objective: See treatment diary below      Assessment: Tolerated treatment well  Patient exhibited good technique with therapeutic exercises      Plan: Continue per plan of care        Precautions: None    Manuals 21   LE Stretch                                Neuro Re-Ed         MTP/LTP blk 2x10 each blk 2x10 each blk 2x10 each blk 2x10 each blk 2x10 each   PPT 2x10 2x10 2x10 2x10  2x10   PPT with march 10x B/L 10x b/l 10x b/l 10x b/l 10x b/l   PPT with heel slides 10x B/L 10x b/l 10x b/l 10x b/l 10x b/l   Bridges 2x10 with hip squeeze With hip squeeze  2x10 2x10 with hip squeeze 2x10 with hip squeeze 2x10 with hip squeeze   Supine Hip adduction squeeze                                        Ther Ex        SRC l5 10 min l5 10 min l3 10 min  l5 10 min l5 10 min    TR/HR 2x10 2x10 2x10 2x10 2x10   Standing Trunk Ext 10x10" 10x10" 10x10" 10x10" 10x10"   Supine hip abd with TB GTB 2x10 GTB 2x10 gTB 2x10 GTB 2x10 GTB 2x10   Leg press     75# 2x10 held                           Ther Activity                        Gait Training                        Modalities

## 2021-08-23 ENCOUNTER — APPOINTMENT (OUTPATIENT)
Dept: PHYSICAL THERAPY | Facility: CLINIC | Age: 41
End: 2021-08-23
Payer: COMMERCIAL

## 2021-08-25 ENCOUNTER — OFFICE VISIT (OUTPATIENT)
Dept: PHYSICAL THERAPY | Facility: CLINIC | Age: 41
End: 2021-08-25
Payer: COMMERCIAL

## 2021-08-25 DIAGNOSIS — M54.16 LUMBAR RADICULOPATHY: Primary | ICD-10-CM

## 2021-08-25 PROCEDURE — 97112 NEUROMUSCULAR REEDUCATION: CPT

## 2021-08-25 PROCEDURE — 97110 THERAPEUTIC EXERCISES: CPT

## 2021-08-30 ENCOUNTER — OFFICE VISIT (OUTPATIENT)
Dept: PHYSICAL THERAPY | Facility: CLINIC | Age: 41
End: 2021-08-30
Payer: COMMERCIAL

## 2021-08-30 DIAGNOSIS — M54.16 LUMBAR RADICULOPATHY: Primary | ICD-10-CM

## 2021-08-30 PROCEDURE — 97110 THERAPEUTIC EXERCISES: CPT

## 2021-08-30 PROCEDURE — 97112 NEUROMUSCULAR REEDUCATION: CPT

## 2021-08-30 NOTE — PROGRESS NOTES
Daily Note     Today's date: 2021  Patient name: Dov Gasca  : 1980  MRN: [id number]  Referring provider: Nima Valladares MD  Dx:   Encounter Diagnosis     ICD-10-CM    1  Lumbar radiculopathy - Left  M54 16                   Subjective: Patient reports increased low back pain secondary to work  "I don't think it will ever get better "      Objective: See treatment diary below      Assessment: Tolerated treatment well  Patient difficult to progress secondary to pain  Plan: Continue per plan of care        Precautions: None    Manuals 21   LE Stretch                                Neuro Re-Ed         MTP/LTP blk 2x10 each blk 2x10 each blk 2x10 each blk 2x10 each blk 2x10 each   PPT 2x10 2x10 2x10 2x10  2x10   PPT with march 10x B/L 10x b/l 10x b/l 10x b/l 10x b/l   PPT with heel slides 10x B/L 10x b/l 10x b/l 10x b/l 10x b/l   Bridges 2x10 with hip squeeze With hip squeeze  2x10 2x10 with hip squeeze 2x10 with hip squeeze 2x10 with hip squeeze   Supine Hip adduction squeeze                                        Ther Ex        SRC l5 10 min l5 10 min l3 10 min  l5 10 min l5 10 min    TR/HR 2x10 2x10 2x10 2x10 2x10   Standing Trunk Ext 10x10" 10x10" 10x10" 10x10" 10x10"   Supine hip abd with TB GTB 2x10 GTB 2x10 gTB 2x10 GTB 2x10 GTB 2x10   Leg press      held                           Ther Activity                        Gait Training                        Modalities

## 2021-09-01 ENCOUNTER — APPOINTMENT (OUTPATIENT)
Dept: PHYSICAL THERAPY | Facility: CLINIC | Age: 41
End: 2021-09-01
Payer: COMMERCIAL

## 2021-09-08 ENCOUNTER — OFFICE VISIT (OUTPATIENT)
Dept: PHYSICAL THERAPY | Facility: CLINIC | Age: 41
End: 2021-09-08
Payer: COMMERCIAL

## 2021-09-08 DIAGNOSIS — M54.16 LUMBAR RADICULOPATHY: Primary | ICD-10-CM

## 2021-09-08 PROCEDURE — 97110 THERAPEUTIC EXERCISES: CPT

## 2021-09-08 PROCEDURE — 97112 NEUROMUSCULAR REEDUCATION: CPT

## 2021-09-08 NOTE — PROGRESS NOTES
Daily Note     Today's date: 2021  Patient name: Brisa Le  : 1980  MRN: [id number]  Referring provider: Hafsa Wallace MD  Dx:   Encounter Diagnosis     ICD-10-CM    1  Lumbar radiculopathy - Left  M54 16                   Subjective:   "im good, definitely headed in the right direction"        Objective: See treatment diary below      Assessment: Tolerated treatment well  Patient exhibited good technique with therapeutic exercises    Patient able to tolerate additional therex and reps without incidence evidence of improved strength  Pt reports decreased pain with activity as well as  Improved mobility  Plan: Continue per plan of care        Precautions: None    Manuals 21   LE Stretch                                Neuro Re-Ed         MTP/LTP blk 2x10 each blk 2x10 each blk 2x10 each blk 2x10 each blk 2x10 each   PPT 2x10 2x10 2x10 2x10  2x10   PPT with march 10x B/L 10x b/l 10x b/l 10x b/l 20x b/l   PPT with heel slides 10x B/L 10x b/l 10x b/l 10x b/l 20x b/l   Bridges 2x10 with hip squeeze With hip squeeze  2x10 2x10 with hip squeeze 2x10 with hip squeeze 2x10 with hip squeeze   Supine Hip adduction squeeze     20x                                    Ther Ex        SRC l5 10 min l5 10 min l3 10 min  l5 10 min l5 10 min    TR/HR 2x10 2x10 2x10 2x10 2x10   Standing Trunk Ext 10x10" 10x10" 10x10" 10x10" 10x10"   Supine hip abd with TB GTB 2x10 GTB 2x10 gTB 2x10 GTB 2x10 GTB 2x10   Leg press      30#   2x10                           Ther Activity                        Gait Training                        Modalities

## 2021-09-09 ENCOUNTER — OFFICE VISIT (OUTPATIENT)
Dept: PHYSICAL THERAPY | Facility: CLINIC | Age: 41
End: 2021-09-09
Payer: COMMERCIAL

## 2021-09-09 DIAGNOSIS — M54.16 LUMBAR RADICULOPATHY: Primary | ICD-10-CM

## 2021-09-09 PROCEDURE — 97110 THERAPEUTIC EXERCISES: CPT | Performed by: PHYSICAL THERAPIST

## 2021-09-09 PROCEDURE — 97112 NEUROMUSCULAR REEDUCATION: CPT | Performed by: PHYSICAL THERAPIST

## 2021-09-09 NOTE — PROGRESS NOTES
PT Re-Evaluation     Today's date: 2021  Patient name: Hayder Layton  : 1980  MRN: [id number]  Referring provider: Emeka Rossi MD  Dx:   Encounter Diagnosis     ICD-10-CM    1  Lumbar radiculopathy - Left  M54 16                   Assessment  Assessment details: Patient has made good progress through 10 total visits  Patient is showing improved core and lumbar stability  PT also notes patient with improved lumbar rom compared to IE  Patient is reporting feeling improved since beginning PT with improved functional ability  Patient does remain limited with work activity and with more strenuous activity  Patient is awaiting approval for MRI for further diagnosis  Patient would benefit from continued PT intervention with focus on maximizing core/lumbar stability and overall strength in order to maximize functional and work ability  Impairments: abnormal or restricted ROM, activity intolerance, impaired physical strength, lacks appropriate home exercise program and pain with function    Goals  ST  Initiate HEP- Met  2  Patient to report decreased pain at worst by 50% in 4 weeks- Progressing    LT  Patient to report decreased pain at worst to 2-3/10 in 8 weeks- Progressing  2  Patient to increase core/lumbar stability to [facility] in 8 weeks- Progressing  3  Patient to improve flexion rom to [facility] in 8 weeks- Progressing  4   Patient to improve functional and work ability to [facility] in 8 weeks- [facility]  Patient would benefit from: skilled physical therapy  Planned modality interventions: cryotherapy, thermotherapy: hydrocollator packs, ultrasound and unattended electrical stimulation  Other planned modality interventions: Modalities PRN  Planned therapy interventions: abdominal trunk stabilization, IADL retraining, ADL retraining, joint mobilization, manual therapy, balance, neuromuscular re-education, patient education, strengthening, stretching, therapeutic activities, therapeutic exercise, therapeutic training, functional ROM exercises, flexibility, graded activity, graded exercise and home exercise program  Frequency: 2x week  Duration in visits: 8  Duration in weeks: 4  Treatment plan discussed with: patient        Subjective Evaluation    History of Present Illness  Date of onset: 2018  Pain  Current pain ratin  At best pain ratin  At worst pain rating: 10  Quality: discomfort (Pinching, shooting)  Relieving factors: heat, rest, support and medications  Aggravating factors: walking, standing and sitting  Progression: improved    Patient Goals  Patient goals for therapy: decreased pain  Patient goal: To get back to doing things         Objective     Concurrent Complaints  Negative for night pain, disturbed sleep, bladder dysfunction, bowel dysfunction, saddle (S4) numbness, cardiac problem, kidney problem, gallbladder problem, stomach problem, ulcer, appendix problem, spleen problem, pancreas problem, history of cancer, history of trauma and infection    Palpation   Left   Muscle spasm in the lumbar paraspinals  Tenderness of the lumbar paraspinals  Right   Muscle spasm in the lumbar paraspinals  Tenderness of the lumbar paraspinals  Tenderness     Lumbar Spine  Tenderness in the spinous process and facet joint       Additional Tenderness Details  TTP noted at each lumbar levels    Neurological Testing     Sensation     Lumbar   Left   Intact: light touch    Right   Intact: light touch    Active Range of Motion     Lumbar   Flexion:  WFL  Extension:  WFL  Left lateral flexion:  WFL  Right lateral flexion:  WFL  Left rotation:  [facility]  Right rotation:  [facility]  Mechanical Assessment    Cervical      Thoracic      Lumbar    Standing flexion:   Pain intensity: worse  Standing extension:   Pain location: no change    Strength/Myotome Testing     Lumbar   Left   Normal strength    Right   Normal strength    Additional Strength Details  3+/5    Muscle Activation   Patient unable to activate left transverse abdominals and right transverse abdominals  Tests     Lumbar     Left   Positive passive SLR  Negative crossed SLR  Right   Negative crossed SLR and passive SLR  Left Hip   Negative HEVER, FADIR and long sit  Right Hip   Negative HEVER, FADIR and long sit       General Comments:    Lower quarter screen   Hips: unremarkable  Knees: unremarkable  Foot/ankle: unremarkable             Precautions: None              Manuals 9/9/21 8/18/21 8/25/21 8/30/21 9/8/2021   LE Stretch                                Neuro Re-Ed         MTP/LTP blk 2x10 each blk 2x10 each blk 2x10 each blk 2x10 each blk 2x10 each   PPT 2x10 2x10 2x10 2x10  2x10   PPT with march 20x B/L 10x b/l 10x b/l 10x b/l 20x b/l   PPT with heel slides 20x B/L 10x b/l 10x b/l 10x b/l 20x b/l   Bridges 2x10 with hip squeeze With hip squeeze  2x10 2x10 with hip squeeze 2x10 with hip squeeze 2x10 with hip squeeze   Supine Hip adduction squeeze 20x    20x                                    Ther Ex        SRC l5 10 min l5 10 min l3 10 min  l5 10 min l5 10 min    TR/HR 2x10 2x10 2x10 2x10 2x10   Standing Trunk Ext 10x10" 10x10" 10x10" 10x10" 10x10"   Supine hip abd with TB GTB 2x10 GTB 2x10 gTB 2x10 GTB 2x10 GTB 2x10   Leg press      30#   2x10                           Ther Activity                        Gait Training                        Modalities

## 2021-09-13 ENCOUNTER — APPOINTMENT (OUTPATIENT)
Dept: PHYSICAL THERAPY | Facility: CLINIC | Age: 41
End: 2021-09-13
Payer: COMMERCIAL

## 2021-09-15 ENCOUNTER — OFFICE VISIT (OUTPATIENT)
Dept: PHYSICAL THERAPY | Facility: CLINIC | Age: 41
End: 2021-09-15
Payer: COMMERCIAL

## 2021-09-15 DIAGNOSIS — M54.16 LUMBAR RADICULOPATHY: Primary | ICD-10-CM

## 2021-09-15 PROCEDURE — 97112 NEUROMUSCULAR REEDUCATION: CPT

## 2021-09-15 PROCEDURE — 97110 THERAPEUTIC EXERCISES: CPT

## 2021-09-15 NOTE — PROGRESS NOTES
Daily Note     Today's date: 9/15/2021  Patient name: Nir Sotomayor  : 1980  MRN: [id number]  Referring provider: Yogesh Rojas MD  Dx:   Encounter Diagnosis     ICD-10-CM    1  Lumbar radiculopathy - Left  M54 16                   Subjective:   "Im doing better"        Objective: See treatment diary below      Assessment: Tolerated treatment well  Patient exhibited good technique with therapeutic exercises  Pt reports over 50% improvement in improvement since IE  He would benefit from progressive stengthening  Plan: Continue per plan of care        Precautions: None    Manuals 9/9/21 9/15/2021 8/25/21 8/30/21 2021   LE Stretch                                Neuro Re-Ed         MTP/LTP blk 2x10 each blk 2x10 each blk 2x10 each blk 2x10 each blk 2x10 each   PPT 2x10 2x10 2x10 2x10  2x10   PPT with march 20x B/L 10x b/l 10x b/l 10x b/l 20x b/l   PPT with heel slides 20x B/L 10x b/l 10x b/l 10x b/l 20x b/l   Bridges 2x10 with hip squeeze With hip squeeze  2x10 2x10 with hip squeeze 2x10 with hip squeeze 2x10 with hip squeeze   Supine Hip adduction squeeze 20x 20x   20x                                    Ther Ex        SRC l5 10 min l5 10 min l3 10 min  l5 10 min l5 10 min    TR/HR 2x10 2x10 2x10 2x10 2x10   Standing Trunk Ext 10x10" 10x10" 10x10" 10x10" 10x10"   Supine hip abd with TB GTB 2x10 GTB 2x10 gTB 2x10 GTB 2x10 GTB 2x10   Leg press   35#  2x10   30#   2x10                           Ther Activity                        Gait Training                        Modalities

## 2021-09-17 ENCOUNTER — APPOINTMENT (OUTPATIENT)
Dept: PHYSICAL THERAPY | Facility: CLINIC | Age: 41
End: 2021-09-17
Payer: COMMERCIAL

## 2021-09-20 ENCOUNTER — OFFICE VISIT (OUTPATIENT)
Dept: PHYSICAL THERAPY | Facility: CLINIC | Age: 41
End: 2021-09-20
Payer: COMMERCIAL

## 2021-09-20 DIAGNOSIS — M54.16 LUMBAR RADICULOPATHY: Primary | ICD-10-CM

## 2021-09-20 PROCEDURE — 97110 THERAPEUTIC EXERCISES: CPT

## 2021-09-20 PROCEDURE — 97112 NEUROMUSCULAR REEDUCATION: CPT

## 2021-09-20 NOTE — PROGRESS NOTES
Daily Note     Today's date: 2021  Patient name: Kaylin Sher  : 1980  MRN: [id number]  Referring provider: Noe Foster MD  Dx:   Encounter Diagnosis     ICD-10-CM    1  Lumbar radiculopathy - Left  M54 16                   Subjective:   "Im doing better, moving around better"        Objective: See treatment diary below      Assessment: Tolerated treatment well  Patient exhibited good technique with therapeutic exercises      Plan: Continue per plan of care        Precautions: None    Manuals 9/9/21 9/15/2021 2021 8/30/21 2021   LE Stretch                                Neuro Re-Ed         MTP/LTP blk 2x10 each blk 2x10 each blk 2x10 each blk 2x10 each blk 2x10 each   PPT 2x10 2x10 2x10 2x10  2x10   PPT with march 20x B/L 10x b/l 2x10 b/l 10x b/l 20x b/l   PPT with heel slides 20x B/L 10x b/l 2x10x b/l 10x b/l 20x b/l   Bridges 2x10 with hip squeeze With hip squeeze  2x10 2x10 with hip squeeze 2x10 with hip squeeze 2x10 with hip squeeze   Supine Hip adduction squeeze 20x 20x 30x  20x                                    Ther Ex        SRC l5 10 min l5 10 min l5   10 min  l5 10 min l5 10 min    TR/HR 2x10 2x10 2x10 2x10 2x10   Standing Trunk Ext 10x10" 10x10" 10x10" 10x10" 10x10"   Supine hip abd with TB GTB 2x10 GTB 2x10 GTB 2x10 GTB 2x10 GTB 2x10   Leg press   35#  2x10 35 #  2x10  30#   2x10                           Ther Activity                        Gait Training                        Modalities

## 2021-09-22 ENCOUNTER — APPOINTMENT (OUTPATIENT)
Dept: PHYSICAL THERAPY | Facility: CLINIC | Age: 41
End: 2021-09-22
Payer: COMMERCIAL

## 2021-09-22 ENCOUNTER — OFFICE VISIT (OUTPATIENT)
Dept: PAIN MEDICINE | Facility: CLINIC | Age: 41
End: 2021-09-22
Payer: COMMERCIAL

## 2021-09-22 VITALS
RESPIRATION RATE: 20 BRPM | DIASTOLIC BLOOD PRESSURE: 82 MMHG | SYSTOLIC BLOOD PRESSURE: 130 MMHG | BODY MASS INDEX: 17.25 KG/M2 | HEIGHT: 71 IN | TEMPERATURE: 98.1 F | WEIGHT: 123.2 LBS

## 2021-09-22 DIAGNOSIS — M54.16 LUMBAR RADICULOPATHY: Primary | ICD-10-CM

## 2021-09-22 PROCEDURE — 3008F BODY MASS INDEX DOCD: CPT | Performed by: ANESTHESIOLOGY

## 2021-09-22 PROCEDURE — 99214 OFFICE O/P EST MOD 30 MIN: CPT | Performed by: ANESTHESIOLOGY

## 2021-09-22 RX ORDER — LIDOCAINE 40 MG/G
CREAM TOPICAL AS NEEDED
Qty: 30 G | Refills: 2 | Status: SHIPPED | OUTPATIENT
Start: 2021-09-22 | End: 2021-10-06 | Stop reason: SDUPTHER

## 2021-09-22 NOTE — PATIENT INSTRUCTIONS
Core Strengthening Exercises   WHAT YOU NEED TO KNOW:   Your core includes the muscles of your lower back, hip, pelvis, and abdomen  Core strengthening exercises help heal and strengthen these muscles  This helps prevent another injury, and keeps your pelvis, spine, and hips in the correct position  DISCHARGE INSTRUCTIONS:   Contact your healthcare provider if:   · You have sharp or worsening pain during exercise or at rest     · You have questions or concerns about your shoulder exercises  Safety tips:  Talk to your healthcare provider before you start an exercise program  A physical therapist can teach you how to do core strengthening exercises safely  · Do the exercises on a mat or firm surface  A firm surface will support your spine and prevent low back pain  Do not do these exercises on a bed  · Move slowly and smoothly  Avoid fast or jerky motions  · Stop if you feel pain  Core exercises should not be painful  Stop if you feel pain  · Breathe normally during core exercises  Do not hold your breath  This may cause an increase in blood pressure and prevent muscle strengthening  Your healthcare provider will tell you when to inhale and exhale during the exercise  · Begin all of your exercises with abdominal bracing  Abdominal bracing helps warm up your core muscles  You can also practice abdominal bracing throughout the day  Lie on your back with your knees bent and feet flat on the floor  Place your arms in a relaxed position beside your body  Tighten your abdominal muscles  Pull your belly button in and up toward your spine  Hold for 5 seconds  Relax your muscles  Repeat 10 times  Core strengthening exercises: Your healthcare provider will tell you how often to do these exercises  The provider will also tell you how many repetitions of each exercise you should do  Hold each exercise for 5 seconds or as directed  As you get stronger, increase your hold to 10 to 15 seconds   You can do some of these exercises on a stability ball, or with a weight  Ask your healthcare provider how to use a stability ball or weight for these exercises:  · Bridging:  Lie on your back with your knees bent and feet flat on the floor  Rest your arms at your side  Tighten your buttocks, and then lift your hips 1 inch off the floor  Hold for 5 seconds  When you can do this exercise without pain for 10 seconds, increase the distance you lift your hips  A good goal is to be able to lift your hips so that your shoulders, hips, and knees are in a straight line  · Dead bug:  Lie on your back with your knees bent and feet flat on the floor  Place your arms in a relaxed position beside your body  Begin with abdominal bracing  Next, raise one leg, keeping your knee bent  Hold for 5 seconds  Repeat with the other leg  When you can do this exercise without pain for 10 to 15 seconds, you may raise one straight leg and hold  Repeat with the other leg  · Quadruped:  Place your hands and knees on the floor  Keep your wrists directly below your shoulders and your knees directly below your hips  Pull your belly button in toward your spine  Do not flatten or arch your back  Tighten your abdominal muscles below your belly button  Hold for 5 seconds  When you can do this exercise without pain for 10 to 15 seconds, you may extend one arm and hold  Repeat on the other side  · Side bridge exercises:      ? Standing side bridge:  Stand next to a wall and extend one arm toward the wall  Place your palm flat on the wall with your fingers pointing upward  Begin with abdominal bracing  Next, without moving your feet, slowly bend your arm to 90 degrees  Hold for 5 seconds  Repeat on the other side  When you can do this exercise without pain for 10 to 15 seconds, you may do the bent leg side bridge on the floor  ? Bent leg side bridge:  Lie on one side with your legs, hips, and shoulders in a straight line   Prop yourself up onto your forearm so your elbow is directly below your shoulder  Bend your knees back to 90 degrees  Begin with abdominal bracing  Next, lift your hips and balance yourself on your forearm and knees  Hold for 5 seconds  Repeat on the other side  When you can do this exercise without pain for 10 to 15 seconds, you may do the straight leg side bridge on the floor  ? Straight leg side bridge:  Lie on one side with your legs, hips, and shoulders in a straight line  Prop yourself up onto your forearm so your elbow is directly below your shoulder  Begin with abdominal bracing  Lift your hips off the floor and balance yourself on your forearm and the outside of your flexed foot  Do not let your ankle bend sideways  Hold for 5 seconds  Repeat on the other side  When you can do this exercise without pain for 10 to 15 seconds, ask your healthcare provider for more advanced exercises  · Superman:  Lie on your stomach  Extend your arms forward on the floor  Tighten your abdominal muscles and lift your right hand and left leg off the floor  Hold this position  Slowly return to the starting position  Tighten your abdominal muscles and lift your left hand and right leg off the floor  Hold this position  Slowly return to the starting position  · Clam:  Lie on your side with your knees bent  Put your bottom arm under your head to keep your neck in line  Put your top hand on your hip to keep your pelvis from moving  Put your heels together, and keep them together during this exercise  Slowly raise your top knee toward the ceiling  Then lower your leg so your knees are together  Repeat this exercise 10 times  Then switch sides and do the exercise 10 times with the other leg  · Curl up:  Lie on your back with your knees bent and feet flat on the floor  Place your hands, palms down, underneath your lower back   Next, with your elbows on the floor, lift your shoulders and chest 2 to 3 inches off the floor  Keep your head in line with your shoulders  Hold this position  Slowly return to the starting position  · Straight leg raises:  Lie on your back with one leg straight  Bend the other knee and place your foot flat on the floor  Tighten your abdominal muscles  Keep your leg straight and slowly lift it straight up 6 to 12 inches off the floor  Hold this position  Lower your leg slowly  Do as many repetitions as directed on this side  Repeat with the other leg  · Plank:  Lie on your stomach  Bend your elbows and place your forearms flat on the floor  Lift your chest, stomach, and knees off the floor  Make sure your elbows are below your shoulders  Your body should be in a straight line  Do not let your hips or lower back sink to the ground  Squeeze your abdominal muscles together and hold for 15 seconds  To make this exercise harder, hold for 30 seconds or lift 1 leg at a time  · Bicycles:  Lie on your back  Bend both knees and bring them toward your chest  Your calves should be parallel to the floor  Place the palms of your hands on the back of your head  Straighten your right leg and keep it lifted 2 inches off the floor  Raise your head and shoulders off the floor and twist towards your left  Keep your head and shoulders lifted  Bend your right knee while you straighten your left leg  Keep your left leg 2 inches off the floor  Twist your head and chest towards the left leg  Continue to straighten 1 leg at a time and twist        Follow up with your healthcare provider as directed:  Write down your questions so you remember to ask them during your visits  © Copyright [employer] 2021 Information is for End User's use only and may not be sold, redistributed or otherwise used for commercial purposes  All illustrations and images included in CareNotes® are the copyrighted property of A D A M , Inc  or [facility] Norma Weber   The above information is an  only   It is not intended as medical advice for individual conditions or treatments  Talk to your doctor, nurse or pharmacist before following any medical regimen to see if it is safe and effective for you

## 2021-09-22 NOTE — PROGRESS NOTES
Assessment  1  Lumbar radiculopathy  -     MRI lumbar spine wo contrast; Future; Expected date: 09/22/2021  -     Ambulatory referral to Physical Therapy; Future  -     lidocaine (LMX) 4 % cream; Apply topically as needed for moderate pain    Left sided lumbar radicular pain in L5 and S1 Dermatomal distribution accompanied by weakness numbness and paresthesias  5/5 strength bilaterally, +facet loading maneuvers, left greater than right  X-ray lumbar spine reviewed which shows moderate to severe degeneration L5-S1 disc space with encroachment into transforaminal recesses  Patient has trialed NSAIDs and gabapentin without significant benefit  Has  Almost finished course of physical therapy and is having good benefit with that  Will proceed with MRI lumbar spine noncontrast to further guide interventional therapy  Plan  -f/u after MRI lumbar spine  -lidocaine 4% cream prescribed to apply topically for pain relief  -gabapentin Discontinued secondary to side effect profile  previously counseled regarding sedative effects of taking this medication and provided up titration calendar  Counseled not to take medication while driving or operating heavy machinery/using stairs  -continue physical therapy for right-sided lumbar radiculopathy; Core exercises additionally provided for physician directed home PT that the patient plans to participate with for 1 hour, twice a week for the next 6 weeks  There are risks associated with opioid medications, including dependence, addiction and tolerance  The patient understands and agrees to use these medications only as prescribed  Potential side effects of the medications include, but are not limited to, constipation, drowsiness, addiction, impaired judgment and risk of fatal overdose if not taken as prescribed  The patient was warned against driving while taking sedation medications  Sharing medications is a felony   At this point in time, the patient is showing no signs of addiction, abuse, diversion or suicidal ideation  [place] Prescription Drug Monitoring Program report was reviewed and was appropriate     Complete risks and benefits including bleeding, infection, tissue reaction, nerve injury and allergic reaction were discussed  The approach was demonstrated using models and literature was provided  Verbal and written consent was obtained  My impressions and treatment recommendations were discussed in detail with the patient who verbalized understanding and had no further questions  Discharge instructions were provided  I personally saw and examined the patient and I agree with the above discussed plan of care  New Medications Ordered This Visit   Medications    lidocaine (LMX) 4 % cream     Sig: Apply topically as needed for moderate pain     Dispense:  30 g     Refill:  2       History of Present Illness    Kyara Serrano is a 39 y o  male with an acute on chronic history of chronic lumbar radicular pain in the left L5 dermatomal distribution  Debilitating weakness numbness and paresthesias accompany the pain  The patient rates the pain at a 8/10 accompanied by electric shock-like shooting features and crampy burning pain in the aforementioned dermatomal distributions  The pain is worse in the mornings as well as the end of the day; exertion such as walking for long periods of time seems to exacerbate the pain  The patient can hardly walk more than a few blocks without having debilitating pain  He tries to maintain an active lifestyle and finds that the current degree of pain seems to compromises his  efforts  The pain significantly impacts independent activities of daily living and contributes to significant disability  He has been having improvements with IADLs and less pain with his physical therapy sessions  He has  Discontinued gabapentin secondary to side effect profile    He has never tried epidural steroid injections in the past  He denies any bowel or bladder dysfunction/incontinence    I have personally reviewed and/or updated the patient's past medical history, past surgical history, family history, social history, current medications, allergies, and vital signs today  Review of Systems   Constitutional: Positive for activity change  HENT: Negative  Eyes: Negative  Respiratory: Negative  Cardiovascular: Negative  Gastrointestinal: Negative  Endocrine: Negative  Genitourinary: Negative  Musculoskeletal: Positive for arthralgias, back pain, gait problem and myalgias  Skin: Negative  Allergic/Immunologic: Negative  Neurological: Positive for weakness and numbness  Hematological: Negative  Psychiatric/Behavioral: Negative  All other systems reviewed and are negative  Patient Active Problem List   Diagnosis    Lumbar radiculopathy - Left       History reviewed  No pertinent past medical history  Past Surgical History:   Procedure Laterality Date    HAND SURGERY Left     SKIN GRAFT         Family History   Problem Relation Age of Onset    No Known Problems Mother     No Known Problems Father        Social History     Occupational History    Not on file   Tobacco Use    Smoking status: Current Every Day Smoker     Types: Cigarettes    Smokeless tobacco: Never Used   Vaping Use    Vaping Use: Never used   Substance and Sexual Activity    Alcohol use: Not Currently    Drug use: Yes     Types: Marijuana    Sexual activity: Yes     Partners: Female       Current Outpatient Medications on File Prior to Visit   Medication Sig    [DISCONTINUED] gabapentin (NEURONTIN) 100 mg capsule Take 1 capsule (100 mg total) by mouth 3 (three) times a day     No current facility-administered medications on file prior to visit         No Known Allergies      Physical Exam    /82   Temp 98 1 °F (36 7 °C)   Resp 20   Ht 5' 11" (1 803 m)   Wt 55 9 kg (123 lb 3 2 oz)   BMI 17 18 kg/m² Constitutional: normal, well developed, well nourished, alert, in no distress and non-toxic and no overt pain behavior  and underweight  Eyes: anicteric  HEENT: grossly intact  Neck: supple, symmetric, trachea midline and no masses   Pulmonary:even and unlabored  Cardiovascular:No edema or pitting edema present  Skin:Normal without rashes or lesions and well hydrated  Psychiatric:Mood and affect appropriate  Neurologic:Cranial Nerves II-XII grossly intact Sensation grossly intact; no clonus negative mendiola's  Reflexes 2+ and brisk  SLR  Positive left-sided  Musculoskeletal:normal gait  Normal heel toe and tip toe walking  pain with lumbar facet loading bilaterally, left greater than right and and with lateral spine rotation, left greater than right  ttp over lumbar paraspinal muscles  Negative iqra's test, negative gaenslen's negative SIJ loading bilaterally  Imaging    EXAM   XR L SPINE AP AND LATERAL-6/29/2021 4:17 pm     HISTORY   chronic pain     COMPARISON   Lumbar spine radiographs dating 12/11/2018  TECHNIQUE   Three views of the lumbosacral spine  FINDINGS   There are 5 non-rib-bearing lumbar type vertebral bodies  L5-S1 disc space narrowing   Vertebral body heights and remaining disc spaces are preserved

## 2021-09-27 ENCOUNTER — APPOINTMENT (OUTPATIENT)
Dept: PHYSICAL THERAPY | Facility: CLINIC | Age: 41
End: 2021-09-27
Payer: COMMERCIAL

## 2021-09-29 ENCOUNTER — APPOINTMENT (OUTPATIENT)
Dept: PHYSICAL THERAPY | Facility: CLINIC | Age: 41
End: 2021-09-29
Payer: COMMERCIAL

## 2021-10-01 ENCOUNTER — HOSPITAL ENCOUNTER (OUTPATIENT)
Dept: MRI IMAGING | Facility: HOSPITAL | Age: 41
Discharge: HOME | End: 2021-10-01
Attending: ANESTHESIOLOGY
Payer: COMMERCIAL

## 2021-10-01 DIAGNOSIS — M54.16 LUMBAR RADICULOPATHY: ICD-10-CM

## 2021-10-01 PROCEDURE — G1004 CDSM NDSC: HCPCS

## 2021-10-01 PROCEDURE — 72148 MRI LUMBAR SPINE W/O DYE: CPT

## 2021-10-06 ENCOUNTER — OFFICE VISIT (OUTPATIENT)
Dept: PAIN MEDICINE | Facility: CLINIC | Age: 41
End: 2021-10-06
Payer: COMMERCIAL

## 2021-10-06 VITALS
RESPIRATION RATE: 20 BRPM | HEART RATE: 83 BPM | DIASTOLIC BLOOD PRESSURE: 74 MMHG | BODY MASS INDEX: 17.58 KG/M2 | SYSTOLIC BLOOD PRESSURE: 108 MMHG | HEIGHT: 71 IN | TEMPERATURE: 98.4 F | WEIGHT: 125.6 LBS

## 2021-10-06 DIAGNOSIS — M54.16 LUMBAR RADICULOPATHY: ICD-10-CM

## 2021-10-06 PROCEDURE — 3008F BODY MASS INDEX DOCD: CPT | Performed by: ANESTHESIOLOGY

## 2021-10-06 PROCEDURE — 99214 OFFICE O/P EST MOD 30 MIN: CPT | Performed by: ANESTHESIOLOGY

## 2021-10-06 RX ORDER — LIDOCAINE 40 MG/G
CREAM TOPICAL AS NEEDED
Qty: 30 G | Refills: 2 | Status: SHIPPED | OUTPATIENT
Start: 2021-10-06

## 2021-10-11 NOTE — PROGRESS NOTES
Patient seen for 13 total visits of PT  Last visit 9/20/21  Patient called clinic after last visit and self d/c due to insurance limitations

## 2022-02-09 NOTE — PROGRESS NOTES
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  Objective: See treatment diary below      Assessment: Tolerated treatment well  Patient exhibited good technique with therapeutic exercises Patient is difficult to progress secondary to pain       Plan: Continue per plan of care        Precautions: None    Manuals 21   LE Stretch                                Neuro Re-Ed         MTP/LTP blk 2x10 each blk 2x10 each blk 2x10 each blk 2x10 each blk 2x10 each   PPT 2x10 2x10 2x10 2x10  2x10   PPT with march 10x B/L 10x b/l 10x b/l 10x b/l 10x b/l   PPT with heel slides 10x B/L 10x b/l 10x b/l 10x b/l 10x b/l   Bridges 2x10 with hip squeeze With hip squeeze  2x10 2x10 with hip squeeze 2x10 with hip squeeze 2x10 with hip squeeze   Supine Hip adduction squeeze                                        Ther Ex        SRC l5 10 min l5 10 min l3 10 min  l5 10 min l5 10 min    TR/HR 2x10 2x10 2x10 2x10 2x10   Standing Trunk Ext 10x10" 10x10" 10x10" 10x10" 10x10"   Supine hip abd with TB GTB 2x10 GTB 2x10 gTB 2x10 GTB 2x10 GTB 2x10   Leg press     75# 2x10 held                           Ther Activity                        Gait Training                        Modalities
Attending Psychiatrist without NP/Trainee

## 2023-03-02 NOTE — PATIENT INSTRUCTIONS
Core Strengthening Exercises   WHAT YOU NEED TO KNOW:   Your core includes the muscles of your lower back, hip, pelvis, and abdomen  Core strengthening exercises help heal and strengthen these muscles  This helps prevent another injury, and keeps your pelvis, spine, and hips in the correct position  DISCHARGE INSTRUCTIONS:   Contact your healthcare provider if:   · You have sharp or worsening pain during exercise or at rest     · You have questions or concerns about your shoulder exercises  Safety tips:  Talk to your healthcare provider before you start an exercise program  A physical therapist can teach you how to do core strengthening exercises safely  · Do the exercises on a mat or firm surface  A firm surface will support your spine and prevent low back pain  Do not do these exercises on a bed  · Move slowly and smoothly  Avoid fast or jerky motions  · Stop if you feel pain  Core exercises should not be painful  Stop if you feel pain  · Breathe normally during core exercises  Do not hold your breath  This may cause an increase in blood pressure and prevent muscle strengthening  Your healthcare provider will tell you when to inhale and exhale during the exercise  · Begin all of your exercises with abdominal bracing  Abdominal bracing helps warm up your core muscles  You can also practice abdominal bracing throughout the day  Lie on your back with your knees bent and feet flat on the floor  Place your arms in a relaxed position beside your body  Tighten your abdominal muscles  Pull your belly button in and up toward your spine  Hold for 5 seconds  Relax your muscles  Repeat 10 times  Core strengthening exercises: Your healthcare provider will tell you how often to do these exercises  The provider will also tell you how many repetitions of each exercise you should do  Hold each exercise for 5 seconds or as directed  As you get stronger, increase your hold to 10 to 15 seconds   You can do some of these exercises on a stability ball, or with a weight  Ask your healthcare provider how to use a stability ball or weight for these exercises:  · Bridging:  Lie on your back with your knees bent and feet flat on the floor  Rest your arms at your side  Tighten your buttocks, and then lift your hips 1 inch off the floor  Hold for 5 seconds  When you can do this exercise without pain for 10 seconds, increase the distance you lift your hips  A good goal is to be able to lift your hips so that your shoulders, hips, and knees are in a straight line  · Dead bug:  Lie on your back with your knees bent and feet flat on the floor  Place your arms in a relaxed position beside your body  Begin with abdominal bracing  Next, raise one leg, keeping your knee bent  Hold for 5 seconds  Repeat with the other leg  When you can do this exercise without pain for 10 to 15 seconds, you may raise one straight leg and hold  Repeat with the other leg  · Quadruped:  Place your hands and knees on the floor  Keep your wrists directly below your shoulders and your knees directly below your hips  Pull your belly button in toward your spine  Do not flatten or arch your back  Tighten your abdominal muscles below your belly button  Hold for 5 seconds  When you can do this exercise without pain for 10 to 15 seconds, you may extend one arm and hold  Repeat on the other side  · Side bridge exercises:      ? Standing side bridge:  Stand next to a wall and extend one arm toward the wall  Place your palm flat on the wall with your fingers pointing upward  Begin with abdominal bracing  Next, without moving your feet, slowly bend your arm to 90 degrees  Hold for 5 seconds  Repeat on the other side  When you can do this exercise without pain for 10 to 15 seconds, you may do the bent leg side bridge on the floor  ? Bent leg side bridge:  Lie on one side with your legs, hips, and shoulders in a straight line   Prop yourself up onto your forearm so your elbow is directly below your shoulder  Bend your knees back to 90 degrees  Begin with abdominal bracing  Next, lift your hips and balance yourself on your forearm and knees  Hold for 5 seconds  Repeat on the other side  When you can do this exercise without pain for 10 to 15 seconds, you may do the straight leg side bridge on the floor  ? Straight leg side bridge:  Lie on one side with your legs, hips, and shoulders in a straight line  Prop yourself up onto your forearm so your elbow is directly below your shoulder  Begin with abdominal bracing  Lift your hips off the floor and balance yourself on your forearm and the outside of your flexed foot  Do not let your ankle bend sideways  Hold for 5 seconds  Repeat on the other side  When you can do this exercise without pain for 10 to 15 seconds, ask your healthcare provider for more advanced exercises  · Superman:  Lie on your stomach  Extend your arms forward on the floor  Tighten your abdominal muscles and lift your right hand and left leg off the floor  Hold this position  Slowly return to the starting position  Tighten your abdominal muscles and lift your left hand and right leg off the floor  Hold this position  Slowly return to the starting position  · Clam:  Lie on your side with your knees bent  Put your bottom arm under your head to keep your neck in line  Put your top hand on your hip to keep your pelvis from moving  Put your heels together, and keep them together during this exercise  Slowly raise your top knee toward the ceiling  Then lower your leg so your knees are together  Repeat this exercise 10 times  Then switch sides and do the exercise 10 times with the other leg  · Curl up:  Lie on your back with your knees bent and feet flat on the floor  Place your hands, palms down, underneath your lower back   Next, with your elbows on the floor, lift your shoulders and chest 2 to 3 inches off the floor  Keep your head in line with your shoulders  Hold this position  Slowly return to the starting position  · Straight leg raises:  Lie on your back with one leg straight  Bend the other knee and place your foot flat on the floor  Tighten your abdominal muscles  Keep your leg straight and slowly lift it straight up 6 to 12 inches off the floor  Hold this position  Lower your leg slowly  Do as many repetitions as directed on this side  Repeat with the other leg  · Plank:  Lie on your stomach  Bend your elbows and place your forearms flat on the floor  Lift your chest, stomach, and knees off the floor  Make sure your elbows are below your shoulders  Your body should be in a straight line  Do not let your hips or lower back sink to the ground  Squeeze your abdominal muscles together and hold for 15 seconds  To make this exercise harder, hold for 30 seconds or lift 1 leg at a time  · Bicycles:  Lie on your back  Bend both knees and bring them toward your chest  Your calves should be parallel to the floor  Place the palms of your hands on the back of your head  Straighten your right leg and keep it lifted 2 inches off the floor  Raise your head and shoulders off the floor and twist towards your left  Keep your head and shoulders lifted  Bend your right knee while you straighten your left leg  Keep your left leg 2 inches off the floor  Twist your head and chest towards the left leg  Continue to straighten 1 leg at a time and twist        Follow up with your healthcare provider as directed:  Write down your questions so you remember to ask them during your visits  © Copyright [address] Information is for End User's use only and may not be sold, redistributed or otherwise used for commercial purposes  All illustrations and images included in CareNotes® are the copyrighted property of [employer]  or [address] [place], [place] [address]mariza Weber   The above information is an  only   It is not intended as medical advice for individual conditions or treatments  Talk to your doctor, nurse or pharmacist before following any medical regimen to see if it is safe and effective for you  Helical Rim Text: The closure involved the helical rim.